# Patient Record
Sex: MALE | Race: WHITE | NOT HISPANIC OR LATINO | Employment: FULL TIME | URBAN - METROPOLITAN AREA
[De-identification: names, ages, dates, MRNs, and addresses within clinical notes are randomized per-mention and may not be internally consistent; named-entity substitution may affect disease eponyms.]

---

## 2023-08-04 ENCOUNTER — LAB (OUTPATIENT)
Dept: LAB | Facility: HOSPITAL | Age: 58
End: 2023-08-04
Payer: COMMERCIAL
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
## 2023-08-04 PROCEDURE — 36415 COLL VENOUS BLD VENIPUNCTURE: CPT

## 2023-08-04 PROCEDURE — 86757 RICKETTSIA ANTIBODY: CPT

## 2023-08-04 PROCEDURE — 86618 LYME DISEASE ANTIBODY: CPT

## 2023-08-04 PROCEDURE — 86666 EHRLICHIA ANTIBODY: CPT

## 2023-08-06 ENCOUNTER — TELEPHONE (OUTPATIENT)
Dept: URGENT CARE | Facility: CLINIC | Age: 58
End: 2023-08-06
Payer: COMMERCIAL

## 2023-08-06 LAB — B BURGDOR IGG+IGM SER QL IA: NEGATIVE

## 2023-08-06 NOTE — TELEPHONE ENCOUNTER
----- Message from PRICE Martins sent at 8/6/2023  1:35 PM EDT -----  Please call patient regarding negative Lyme antibody testing.  Advise patient [place] spotted fever and Ehrlichia tests are pending.

## 2023-08-06 NOTE — TELEPHONE ENCOUNTER
----- Message from PRICE Martins sent at 8/6/2023  1:35 PM EDT -----  Please call patient regarding negative Lyme antibody testing.  Advise patient [place] spotted fever and Ehrlichia tests are pending.

## 2023-08-08 LAB
R RICKETTSI IGG SER QL IA: NEGATIVE
R RICKETTSI IGM SER-ACNC: 0.45 INDEX (ref 0–0.89)

## 2023-08-09 ENCOUNTER — TELEPHONE (OUTPATIENT)
Dept: URGENT CARE | Facility: CLINIC | Age: 58
End: 2023-08-09
Payer: COMMERCIAL

## 2023-08-09 LAB
A PHAGOCYTOPH IGG TITR SER IF: NEGATIVE {TITER}
A PHAGOCYTOPH IGM TITR SER IF: NEGATIVE {TITER}
E CHAFFEENSIS IGG TITR SER IF: NEGATIVE {TITER}
E CHAFFEENSIS IGM TITR SER IF: NEGATIVE {TITER}
RESULT COMMENT:: NORMAL

## 2023-08-09 NOTE — TELEPHONE ENCOUNTER
----- Message from PRICE Steele sent at 8/9/2023  8:37 AM EDT -----  Please notify patient of negative [place] spotted fever panel.

## 2023-08-10 ENCOUNTER — TELEPHONE (OUTPATIENT)
Dept: URGENT CARE | Facility: CLINIC | Age: 58
End: 2023-08-10
Payer: COMMERCIAL

## 2023-08-10 NOTE — TELEPHONE ENCOUNTER
----- Message from Jay Jay Tabor MD sent at 8/9/2023  3:40 PM EDT -----  Please call the patient regarding negative erlichia panel - if symptoms continue see your primary